# Patient Record
Sex: FEMALE | Race: WHITE | NOT HISPANIC OR LATINO | Employment: FULL TIME | URBAN - METROPOLITAN AREA
[De-identification: names, ages, dates, MRNs, and addresses within clinical notes are randomized per-mention and may not be internally consistent; named-entity substitution may affect disease eponyms.]

---

## 2024-10-04 NOTE — PROGRESS NOTES
[facility] Pulmonary New Patient    CHIEF COMPLAINT    Shortness of breath     HISTORY OF PRESENT ILLNESS    Анна Posadas is a 74 y.o.female who presents today to establish care with our pulmonary service regarding progressive shortness of breath with exertion over the past 3-4 years. Does not recall any issues leading up to this.     History of breast cancer 1999 and again in 2021 with left-wall radiation both times. Followed by Dr. Powell in [place], [place]. Serial imaging has been without disease recurrence.     History of recurrent respiratory illness as a child. Does not carry a history of childhood asthma. Believes she was a term infant.     Works as a tidal  at a car lot.     Does have a parrot as well as cat, dog, and horses.     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     Lives in a 2 level home. Does have a basement. Has not been tested for radon.     She is a lifelong non-smoker.     Does wear 2L NC at night. Had an overnight oximetry that revealed nocturnal hypoxemia. [employer] company is [employer]. 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     Follows with a cardiologist in [place], [place] and does have a LBBB. Unknown if she has had an echocardiogram. Reportedly has had a stress test which was normal although I am unable to view these results.     Denies recent ED visits, hospitalizations, or exacerbations.     Denies fever, chills, night sweats, or hemoptysis. No recent sick contacts. No chest pain or palpitations. Denies lower extremity swelling or calf tenderness.     Patient Active Problem List   Diagnosis    Shortness of breath    Lung nodule    Gastroesophageal reflux disease without esophagitis    MISTI    Nocturnal hypoxemia    History of left breast cancer with radiation x 2    LBBB       Allergies   Allergen Reactions    Codeine GI Intolerance       Current Outpatient Medications:     Apple Cider Vinegar 500 MG tablet, Take  by mouth., Disp: , Rfl:      ascorbic acid (VITAMIN C) 500 MG tablet, Take 1 tablet by mouth Daily., Disp: , Rfl:     aspirin 81 MG chewable tablet, Chew 1 tablet Daily., Disp: , Rfl:     calcium carbonate (OS-JANNY) 600 MG tablet, Take 1 tablet by mouth., Disp: , Rfl:     Cholecalciferol 10 MCG (400 UNIT) tablet, Take 1 tablet by mouth Daily., Disp: , Rfl:     glucosamine-chondroitin 500-400 MG per tablet, Take 2 tablets by mouth 3 (Three) Times a Day., Disp: , Rfl:     lansoprazole (PREVACID) 15 MG capsule, 1 capsule before a meal Orally Once a day for 30 day(s), Disp: , Rfl:     letrozole (FEMARA) 2.5 MG tablet, Take 1 tablet by mouth Daily., Disp: , Rfl:     levalbuterol (XOPENEX HFA) 45 MCG/ACT inhaler, INHALE 2 PUFFS BY MOUTH 4 TIMES DAILY AS NEEDED, Disp: , Rfl:     Magnesium 400 MG tablet, 200 mg., Disp: , Rfl:     metoprolol tartrate (LOPRESSOR) 25 MG tablet, Take 1 tablet by mouth 2 (Two) Times a Day., Disp: , Rfl:     multivitamin (THERAGRAN) tablet tablet, Take 1 tablet by mouth Daily., Disp: , Rfl:     pantoprazole (PROTONIX) 40 MG EC tablet, Take 1 tablet by mouth Daily., Disp: , Rfl:     potassium chloride (KLOR-CON M20) 20 MEQ CR tablet, Take 5 tablets by mouth., Disp: , Rfl:     solifenacin (VESICARE) 10 MG tablet, Take 1 tablet by mouth Daily., Disp: , Rfl:     Vitamin E 450 MG (1000 UT) capsule, Take 0.5 tablets by mouth., Disp: , Rfl:     zinc gluconate 50 MG tablet, Take 1 tablet by mouth Daily., Disp: , Rfl:   MEDICATION LIST AND ALLERGIES REVIEWED.    Social History     Tobacco Use    Smoking status: Never     Passive exposure: Never    Smokeless tobacco: Never   Vaping Use    Vaping status: Never Used   Substance Use Topics    Alcohol use: Not Currently    Drug use: Never       FAMILY AND SOCIAL HISTORY REVIEWED.    Review of Systems   Constitutional:  Negative for chills, fatigue and fever.   Respiratory:  Positive for shortness of breath. Negative for cough, chest tightness and wheezing.    Cardiovascular:  Negative for  "chest pain, palpitations and leg swelling.   Skin:  Negative for color change.   Psychiatric/Behavioral:  Negative for sleep disturbance.    All other systems reviewed and are negative.  .    /70   Pulse 95   Temp 98 °F (36.7 °C)   Ht 177.8 cm (70\")   Wt 89.4 kg (197 lb)   SpO2 94%   BMI 28.27 kg/m²     Immunization History   Administered Date(s) Administered    Arexvy (RSV, Adults 60+ yrs) 11/04/2023    COVID-19 (MODERNA) 1st,2nd,3rd Dose Monovalent 03/12/2021, 04/15/2021    COVID-19 (MODERNA) BIVALENT 12+YRS 10/04/2022    COVID-19 (MODERNA) Monovalent Original Booster 10/26/2021, 05/20/2022    COVID-19 (PFIZER) 12YRS+ (COMIRNATY) 10/05/2023, 09/17/2024    Fluzone High-Dose 65+YRS 09/17/2024    Fluzone High-Dose 65+yrs 10/19/2021, 09/09/2022    Influenza Injectable Mdck Pf Quad 10/05/2023    Pneumococcal Conjugate 13-Valent (PCV13) 10/19/2021    Pneumococcal Conjugate 20-Valent (PCV20) 09/09/2022    Shingrix 12/04/2023       Physical Exam  Vitals reviewed.   Constitutional:       General: She is not in acute distress.     Appearance: Normal appearance.   Cardiovascular:      Rate and Rhythm: Normal rate and regular rhythm.      Pulses: Normal pulses.      Heart sounds: Normal heart sounds.   Pulmonary:      Effort: Pulmonary effort is normal. No respiratory distress.      Breath sounds: No wheezing, rhonchi or rales.   Skin:     General: Skin is warm and dry.   Neurological:      General: No focal deficit present.      Mental Status: She is alert and oriented to person, place, and time.         RESULTS    PFTs:  10/7/24: No airway obstruction by ATS guidelines.  Mild-moderate restriction with TLC 4.74, 78% predicted and FVC 1.62, 63% predicted.  Reduced DLCO that corrects for alveolar volume.    Imaging:   CXR/PA and lateral 10/7/2024  Awaiting MD interpretation    OSH CT of the chest reviewed in PACS dating back to 2015.    PROBLEM LIST    Problem List Items Addressed This Visit       Shortness of " breath    Relevant Orders    Adult Transthoracic Echo Complete W/ Cont if Necessary Per Protocol    CT Chest Without Contrast    Lung nodule - Primary    Relevant Medications    levalbuterol (XOPENEX HFA) 45 MCG/ACT inhaler    Other Relevant Orders    XR Chest PA & Lateral    Breathing Capacity Test (Completed)    Gastroesophageal reflux disease without esophagitis    Relevant Medications    lansoprazole (PREVACID) 15 MG capsule    pantoprazole (PROTONIX) 40 MG EC tablet    MISTI    Nocturnal hypoxemia    History of left breast cancer with radiation x 2    LBBB    Relevant Medications    metoprolol tartrate (LOPRESSOR) 25 MG tablet       DISCUSSION    Ms. Posadas was seen today to establish care with pulmonary with complaints of progressive shortness of breath over the past 2 years.  She does not recall any particular event that ignited her dyspnea.    She does not have any formal history of lung disease and PFTs today do show mild to moderate restriction without obstruction and reduced DLCO that does correct for alveolar volume.    Does have a history of breast cancer and received radiation to her left chest wall in the 1990s and again in 2021.  I suspect that her restrictive defect on her PFTs is likely due to her prior chest wall radiation.    Has had serial CTs of the chest regarding her malignancy which have been reviewed today.  There is no radiology report available, however there is no obvious suspicious pulmonary nodule or masses.  Does appear to have some chronic changes particularly of the anterior left chest wall compatible with radiation changes.  Will repeat a CT of the chest for ongoing evaluation.    Did previously undergo a sleep study noted to have MISTI but was intolerant to CPAP.  As such she was placed on 2 L NC to wear nocturnally and lieu of that diagnosis.  At current she is not interested in repursuing a sleep study and I did discuss that untreated MISTI can had negative health implications.    She  does have a pet bird, however largely denies any constitutional symptoms concerning for a hypersensitivity pneumonitis picture.  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  Is unaware of any overt mold exposure.  Should she develop symptoms, we will pursue additional testing at that time.    Is followed by cardiologist and has a LBBB.  She is unaware of any echocardiogram testing.  I will order this today to rule out any valvular disease or diastolic dysfunction that could be playing a role in her dyspnea.    Does carry a history of gastric reflux on a PPI but does not routinely abide by reflux precautions.  She does have some chronic changes on her CT imaging in the bilateral lower lobes which could reflect chronic microaspiration.  Reflux precautions were discussed at length and I do encourage adherence.    Return to clinic in in 1 month with 6 MWT.  Or may see me sooner if needed.    I personally spent a total of 45 minutes on patient visit today including chart review, face to face with the patient obtaining the history and physical exam, review of pertinent images and tests, counseling and discussion and/or coordination of care as described above, and documentation.  Total time excludes time spent on other separate services such as performing procedures or test interpretation, if applicable.    Electronically signed by EMY Bravo, 10/08/24, 8:58 AM EDT.    Please note that portions of this note were completed with a voice recognition program.      CC: Patricio Castellano MD

## 2024-10-07 ENCOUNTER — OFFICE VISIT (OUTPATIENT)
Dept: PULMONOLOGY | Facility: CLINIC | Age: 74
End: 2024-10-07
Payer: MEDICARE

## 2024-10-07 VITALS
WEIGHT: 197 LBS | DIASTOLIC BLOOD PRESSURE: 70 MMHG | HEART RATE: 95 BPM | BODY MASS INDEX: 28.2 KG/M2 | HEIGHT: 70 IN | TEMPERATURE: 98 F | OXYGEN SATURATION: 94 % | SYSTOLIC BLOOD PRESSURE: 120 MMHG

## 2024-10-07 DIAGNOSIS — R91.1 LUNG NODULE: Primary | ICD-10-CM

## 2024-10-07 DIAGNOSIS — G47.33 OSA (OBSTRUCTIVE SLEEP APNEA): ICD-10-CM

## 2024-10-07 DIAGNOSIS — I44.7 LBBB (LEFT BUNDLE BRANCH BLOCK): ICD-10-CM

## 2024-10-07 DIAGNOSIS — Z85.3 HISTORY OF BREAST CANCER: ICD-10-CM

## 2024-10-07 DIAGNOSIS — G47.34 NOCTURNAL HYPOXEMIA: ICD-10-CM

## 2024-10-07 DIAGNOSIS — K21.9 GASTROESOPHAGEAL REFLUX DISEASE WITHOUT ESOPHAGITIS: ICD-10-CM

## 2024-10-07 DIAGNOSIS — R06.02 SHORTNESS OF BREATH: ICD-10-CM

## 2024-10-07 RX ORDER — SOLIFENACIN SUCCINATE 10 MG/1
1 TABLET, FILM COATED ORAL DAILY
COMMUNITY

## 2024-10-07 RX ORDER — LETROZOLE 2.5 MG/1
1 TABLET, FILM COATED ORAL DAILY
COMMUNITY

## 2024-10-07 RX ORDER — POTASSIUM CHLORIDE 1500 MG/1
100 TABLET, EXTENDED RELEASE ORAL
COMMUNITY

## 2024-10-07 RX ORDER — CALCIUM CARBONATE 300MG(750)
200 TABLET,CHEWABLE ORAL
COMMUNITY

## 2024-10-07 RX ORDER — MECOBALAMIN 5000 MCG
TABLET,DISINTEGRATING ORAL
COMMUNITY

## 2024-10-07 RX ORDER — PHENOL 1.4 %
600 AEROSOL, SPRAY (ML) MUCOUS MEMBRANE
COMMUNITY

## 2024-10-07 RX ORDER — ASCORBIC ACID 500 MG
500 TABLET ORAL DAILY
COMMUNITY

## 2024-10-07 RX ORDER — METOPROLOL TARTRATE 25 MG/1
1 TABLET, FILM COATED ORAL 2 TIMES DAILY
COMMUNITY

## 2024-10-07 RX ORDER — ZINC GLUCONATE 50 MG
50 TABLET ORAL DAILY
COMMUNITY

## 2024-10-07 RX ORDER — TETRACYCLINE HCL 500 MG
CAPSULE ORAL
COMMUNITY

## 2024-10-07 RX ORDER — MULTIVIT WITH MINERALS/LUTEIN
0.5 TABLET ORAL
COMMUNITY

## 2024-10-07 RX ORDER — DIPHENOXYLATE HYDROCHLORIDE AND ATROPINE SULFATE 2.5; .025 MG/1; MG/1
1 TABLET ORAL DAILY
COMMUNITY

## 2024-10-07 RX ORDER — PANTOPRAZOLE SODIUM 40 MG/1
1 TABLET, DELAYED RELEASE ORAL DAILY
COMMUNITY

## 2024-10-07 RX ORDER — ASPIRIN 81 MG/1
1 TABLET, CHEWABLE ORAL DAILY
COMMUNITY

## 2024-10-07 RX ORDER — RIBOFLAVIN (VITAMIN B2) 100 MG
2 TABLET ORAL 3 TIMES DAILY
COMMUNITY

## 2024-10-07 RX ORDER — OMEGA-3S/DHA/EPA/FISH OIL/D3 300MG-1000
400 CAPSULE ORAL DAILY
COMMUNITY

## 2024-10-07 RX ORDER — LEVALBUTEROL TARTRATE 45 UG/1
AEROSOL, METERED ORAL
COMMUNITY

## 2024-10-08 PROBLEM — G47.34 NOCTURNAL HYPOXEMIA: Status: ACTIVE | Noted: 2024-10-08

## 2024-10-08 PROBLEM — K21.9 GASTROESOPHAGEAL REFLUX DISEASE WITHOUT ESOPHAGITIS: Status: ACTIVE | Noted: 2024-10-08

## 2024-10-08 PROBLEM — Z85.3 HISTORY OF BREAST CANCER: Status: ACTIVE | Noted: 2024-10-08

## 2024-10-08 PROBLEM — I44.7 LBBB (LEFT BUNDLE BRANCH BLOCK): Status: ACTIVE | Noted: 2024-10-08

## 2024-10-08 PROBLEM — G47.33 OSA (OBSTRUCTIVE SLEEP APNEA): Status: ACTIVE | Noted: 2024-10-08

## 2024-10-08 PROBLEM — R91.1 LUNG NODULE: Status: ACTIVE | Noted: 2024-10-08

## 2024-10-29 DIAGNOSIS — R06.02 SHORTNESS OF BREATH: ICD-10-CM

## 2024-11-04 ENCOUNTER — OFFICE VISIT (OUTPATIENT)
Dept: PULMONOLOGY | Facility: CLINIC | Age: 74
End: 2024-11-04
Payer: MEDICARE

## 2024-11-04 VITALS
OXYGEN SATURATION: 98 % | TEMPERATURE: 97.8 F | BODY MASS INDEX: 28.2 KG/M2 | SYSTOLIC BLOOD PRESSURE: 112 MMHG | HEART RATE: 86 BPM | HEIGHT: 70 IN | DIASTOLIC BLOOD PRESSURE: 82 MMHG | RESPIRATION RATE: 16 BRPM | WEIGHT: 197 LBS

## 2024-11-04 DIAGNOSIS — Z85.3 HISTORY OF BREAST CANCER: ICD-10-CM

## 2024-11-04 DIAGNOSIS — R06.02 SHORTNESS OF BREATH: ICD-10-CM

## 2024-11-04 DIAGNOSIS — R00.2 PALPITATIONS: ICD-10-CM

## 2024-11-04 DIAGNOSIS — R91.1 LUNG NODULE: ICD-10-CM

## 2024-11-04 DIAGNOSIS — R00.0 RAPID OR IRREGULAR HEARTBEAT: Primary | ICD-10-CM

## 2024-11-04 DIAGNOSIS — K21.9 GASTROESOPHAGEAL REFLUX DISEASE WITHOUT ESOPHAGITIS: ICD-10-CM

## 2024-11-04 DIAGNOSIS — G47.34 NOCTURNAL HYPOXEMIA: ICD-10-CM

## 2024-11-04 PROCEDURE — 93005 ELECTROCARDIOGRAM TRACING: CPT | Performed by: NURSE PRACTITIONER

## 2024-11-04 PROCEDURE — 99214 OFFICE O/P EST MOD 30 MIN: CPT | Performed by: NURSE PRACTITIONER

## 2024-11-04 PROCEDURE — 94618 PULMONARY STRESS TESTING: CPT | Performed by: NURSE PRACTITIONER

## 2024-11-04 NOTE — PROGRESS NOTES
"[facility] Pulmonary New Patient    CHIEF COMPLAINT    Shortness of breath     HISTORY OF PRESENT ILLNESS    HPI:   Анна Posadas is a 74 y.o.female who recently established care with our pulmonary service regarding her progressive exertional shortness of breath over the past 3-4 years. Does not recall any issues leading up to this.     History of breast cancer 1999 and again in 2021 with left-wall radiation both times. Followed by Dr. Powell in [place], [place]. Serial imaging has been without disease recurrence.     History of recurrent respiratory illness as a child. Does not carry a history of childhood asthma. Believes she was a term infant.     Works as a tidal  at a car lot.     Does have a parrot as well as cat, dog, and horses.     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     Lives in a two level home. Does have a basement. Has not been tested for radon.     She is a lifelong non-smoker.     Does wear 2L NC at night. Had an overnight oximetry that revealed nocturnal hypoxemia. [employer] is [employer]. 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     Follows with a cardiologist in [place], [place] and does have a LBBB. Unknown if she has had an echocardiogram. Reportedly has had a stress test which was normal although I am unable to view these results.     Interval history:   Ms. Posadas is seen today in follow-up and is here to discuss her most recent CT of the chest.    Continues to have issues with exertional dyspnea although she is very active.  She still works full-time and also owns a farm with multiple animals.    She is able to walk on a flat surface for quite some time before she becomes short of breath.  She does feel that when she is \"rushed\" that this significantly worsens her shortness of breath and believes there may be an underlying anxiety component.    Continues to follow with cardiology in [place] and does complain of palpitations accompanied by " this shortness of breath.  She will check her heart rate and it will be as high as the 120s but her BP is reportedly stable.  She does become dizzy with this as well.    Denies recent ED visits, hospitalizations, or exacerbations.     Denies fever, chills, night sweats, or hemoptysis. No recent sick contacts. No chest pain or palpitations. Denies lower extremity swelling or calf tenderness.     Patient Active Problem List   Diagnosis    Shortness of breath    Lung nodule    Gastroesophageal reflux disease without esophagitis    MISTI    Nocturnal hypoxemia    History of left breast cancer with radiation x 2    LBBB    Palpitations       Allergies   Allergen Reactions    Codeine GI Intolerance       Current Outpatient Medications:     Apple Cider Vinegar 500 MG tablet, Take  by mouth., Disp: , Rfl:     ascorbic acid (VITAMIN C) 500 MG tablet, Take 1 tablet by mouth Daily., Disp: , Rfl:     aspirin 81 MG chewable tablet, Chew 1 tablet Daily., Disp: , Rfl:     calcium carbonate (OS-JANNY) 600 MG tablet, Take 1 tablet by mouth., Disp: , Rfl:     Cholecalciferol 10 MCG (400 UNIT) tablet, Take 1 tablet by mouth Daily., Disp: , Rfl:     glucosamine-chondroitin 500-400 MG per tablet, Take 2 tablets by mouth 3 (Three) Times a Day., Disp: , Rfl:     lansoprazole (PREVACID) 15 MG capsule, 1 capsule before a meal Orally Once a day for 30 day(s), Disp: , Rfl:     letrozole (FEMARA) 2.5 MG tablet, Take 1 tablet by mouth Daily., Disp: , Rfl:     levalbuterol (XOPENEX HFA) 45 MCG/ACT inhaler, INHALE 2 PUFFS BY MOUTH 4 TIMES DAILY AS NEEDED, Disp: , Rfl:     Magnesium 400 MG tablet, 200 mg., Disp: , Rfl:     metoprolol tartrate (LOPRESSOR) 25 MG tablet, Take 1 tablet by mouth 2 (Two) Times a Day., Disp: , Rfl:     multivitamin (THERAGRAN) tablet tablet, Take 1 tablet by mouth Daily., Disp: , Rfl:     pantoprazole (PROTONIX) 40 MG EC tablet, Take 1 tablet by mouth Daily., Disp: , Rfl:     potassium chloride (KLOR-CON M20) 20 MEQ CR  "tablet, Take 5 tablets by mouth., Disp: , Rfl:     solifenacin (VESICARE) 10 MG tablet, Take 1 tablet by mouth Daily., Disp: , Rfl:     Vitamin E 450 MG (1000 UT) capsule, Take 0.5 tablets by mouth., Disp: , Rfl:     zinc gluconate 50 MG tablet, Take 1 tablet by mouth Daily., Disp: , Rfl:   MEDICATION LIST AND ALLERGIES REVIEWED.    Social History     Tobacco Use    Smoking status: Never     Passive exposure: Never    Smokeless tobacco: Never   Vaping Use    Vaping status: Never Used   Substance Use Topics    Alcohol use: Not Currently    Drug use: Never       FAMILY AND SOCIAL HISTORY REVIEWED.    Review of Systems   Constitutional:  Negative for chills, fatigue and fever.   Respiratory:  Positive for shortness of breath. Negative for cough, chest tightness and wheezing.    Cardiovascular:  Positive for palpitations. Negative for chest pain and leg swelling.   Skin:  Negative for color change.   Psychiatric/Behavioral:  Negative for sleep disturbance.    All other systems reviewed and are negative.  .    /82   Pulse 86   Temp 97.8 °F (36.6 °C)   Resp 16   Ht 177.8 cm (70\")   Wt 89.4 kg (197 lb)   SpO2 98% Comment: room air at resting  BMI 28.27 kg/m²     Immunization History   Administered Date(s) Administered    Arexvy (RSV, Adults 60+ yrs) 11/04/2023    COVID-19 (MODERNA) 1st,2nd,3rd Dose Monovalent 03/12/2021, 04/15/2021    COVID-19 (MODERNA) BIVALENT 12+YRS 10/04/2022    COVID-19 (MODERNA) Monovalent Original Booster 10/26/2021, 05/20/2022    COVID-19 (PFIZER) 12YRS+ (COMIRNATY) 10/05/2023, 09/17/2024    Fluzone High-Dose 65+YRS 09/17/2024    Fluzone High-Dose 65+yrs 10/19/2021, 09/09/2022    Influenza Injectable Mdck Pf Quad 10/05/2023    Pneumococcal Conjugate 13-Valent (PCV13) 10/19/2021    Pneumococcal Conjugate 20-Valent (PCV20) 09/09/2022    Shingrix 12/04/2023       Physical Exam  Vitals reviewed.   Constitutional:       General: She is not in acute distress.     Appearance: Normal " appearance.   Cardiovascular:      Rate and Rhythm: Normal rate and regular rhythm.      Pulses: Normal pulses.      Heart sounds: Normal heart sounds.   Pulmonary:      Effort: Pulmonary effort is normal. No respiratory distress.      Breath sounds: No wheezing, rhonchi or rales.   Skin:     General: Skin is warm and dry.   Neurological:      General: No focal deficit present.      Mental Status: She is alert and oriented to person, place, and time.         RESULTS    PFTs:  10/7/24: No airway obstruction by ATS guidelines.  Mild-moderate restriction with TLC 4.74, 78% predicted and FVC 1.62, 63% predicted.  Reduced DLCO that corrects for alveolar volume.    Imaging:   OSH CT of the chest [facility] 10/28/2024  1.  Stable reticular interstitial changes in the lungs.  Stable groundglass opacity in the right apex.  Stable nodules in the lungs.  No definite metastatic nodules.  2.  The liver is decreased attenuation consistent with fatty infiltration with minimal nodular raises concern for possible cirrhotic change.    OSH CT of the chest reviewed in PACS dating back to 2015.    OSH echocardiogram 10/28/2024  Normal sinus rhythm with ectopy  EF 55-60%  No diagnostic regional wall motion abnormality  Suggestive of grade 1 diastolic dysfunction  Minimal valvular disease noted    6 MWT  11/4/2024: Unrevealing for exertional hypoxemia with lowest SpO2 90% at the 3-minute leonidas.  She was able to ambulate 900 feet.    PROBLEM LIST    Problem List Items Addressed This Visit       Shortness of breath    Lung nodule    Gastroesophageal reflux disease without esophagitis    Nocturnal hypoxemia    History of left breast cancer with radiation x 2    Palpitations     Other Visit Diagnoses       Rapid or irregular heartbeat    -  Primary          DISCUSSION    Ms. Posadas was seen today in follow-up complaints of progressive shortness of breath over the past 2 years.  PFTs unrevealing for obstruction but did show  restriction and she does have a history of breast cancer received radiation to her left chest wall in the 1990s and again in 2021, therefore her restrictive defect was felt to be related to this.    She has been stable since her last pulmonary evaluation.    Has had intermittent palpitations and tachyarrhythmias since her last evaluation that she feels causes significant shortness of breath accompanied by some dizziness.    Exertional shortness of breath  Restrictive defect on PFTs  MISTI intolerant of CPAP /nocturnal hypoxemia  GERD  Palpitations  PFTs unrevealing for obstruction and do show mild to moderate restriction.  This is most likely related to her prior history of left chest wall radiation.  Will continue to follow this closely and repeat PFTs next fall.  Serial CT of the chest continues to show stable reticular interstitial changes with a stable groundglass opacity in the right apex.  Does have some pulmonary nodules largest measuring 8 mm in the left posterior major fissure stable compared to 2022 imaging.  Continue Xopenex as needed for shortness of breath  Previously did undergo a sleep study noted to have MISTI but was intolerant to BiPAP.  As such she was placed on 2 L NC to wear nocturnally.  Declines repeat sleep study or overnight oximetry at that time and wishes to continue supplemental oxygen at night.  6 MWT today unrevealing for exertional hypoxemia  Echocardiogram unrevealing for dyspnea etiology.  Noted to have grade 1 diastolic dysfunction but appears euvolemic on exam today and denies any issues with lower extremity swelling.  I am concerned she could have an underlying atrial arrhythmia given her symptomatic palpitations accompanied by dizziness.  EKG obtained today in the office and does show bigeminy with ongoing LBBB.  She does have a history of a LBBB followed by cardiologist in [place]. I have urged her to consider mobile cardiac monitoring and she will reach out to her cardiologist  regarding this.  I am also more than happy to order this.  She does have a pet bird, however largely denies any constitutional symptoms concerning for a hypersensitivity pneumonitis picture.  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  Is unaware of any overt mold exposure.  Should she develop symptoms, we will pursue additional testing at that time.  Does carry a history of gastric reflux on a PPI but does not routinely abide by reflux precautions.  She does have some chronic changes on her CT imaging in the bilateral lower lobes which could reflect chronic microaspiration.  Reflux precautions were discussed at length and I do encourage adherence.    Return to clinic in in 6 month with Dr. Thompson or may see me sooner if needed.    I personally spent a total of 35 minutes on patient visit today including chart review, face to face with the patient obtaining the history and physical exam, review of pertinent images and tests, counseling and discussion and/or coordination of care as described above, and documentation.  Total time excludes time spent on other separate services such as performing procedures or test interpretation, if applicable.    Electronically signed by EMY Bravo, 11/04/24, 2:32 PM EST.    Please note that portions of this note were completed with a voice recognition program.      CC: Patricio Castellano MD

## 2024-11-05 DIAGNOSIS — R06.02 SHORTNESS OF BREATH: ICD-10-CM

## 2025-02-12 ENCOUNTER — TELEPHONE (OUTPATIENT)
Dept: PULMONOLOGY | Facility: CLINIC | Age: 75
End: 2025-02-12
Payer: MEDICARE

## 2025-02-12 NOTE — TELEPHONE ENCOUNTER
Pt called wanting to make sure that Heart Monitor or Stress Test have received. Called [facility] for report to be faxed.

## 2025-04-28 ENCOUNTER — OFFICE VISIT (OUTPATIENT)
Dept: PULMONOLOGY | Facility: CLINIC | Age: 75
End: 2025-04-28
Payer: MEDICARE

## 2025-04-28 VITALS
BODY MASS INDEX: 29.78 KG/M2 | HEART RATE: 82 BPM | SYSTOLIC BLOOD PRESSURE: 140 MMHG | HEIGHT: 70 IN | WEIGHT: 208 LBS | DIASTOLIC BLOOD PRESSURE: 92 MMHG | OXYGEN SATURATION: 95 %

## 2025-04-28 DIAGNOSIS — I44.7 LBBB (LEFT BUNDLE BRANCH BLOCK): ICD-10-CM

## 2025-04-28 DIAGNOSIS — G47.34 NOCTURNAL HYPOXEMIA: ICD-10-CM

## 2025-04-28 DIAGNOSIS — R91.1 LUNG NODULE: ICD-10-CM

## 2025-04-28 DIAGNOSIS — R06.02 SHORTNESS OF BREATH: ICD-10-CM

## 2025-04-28 DIAGNOSIS — K21.9 GASTROESOPHAGEAL REFLUX DISEASE WITHOUT ESOPHAGITIS: ICD-10-CM

## 2025-04-28 DIAGNOSIS — R00.2 PALPITATIONS: ICD-10-CM

## 2025-04-28 DIAGNOSIS — G47.33 OSA (OBSTRUCTIVE SLEEP APNEA): ICD-10-CM

## 2025-04-28 DIAGNOSIS — Z85.3 HISTORY OF BREAST CANCER: Primary | ICD-10-CM

## 2025-04-28 RX ORDER — ALBUTEROL SULFATE 90 UG/1
2 INHALANT RESPIRATORY (INHALATION) EVERY 4 HOURS PRN
Qty: 17 G | Refills: 11 | Status: SHIPPED | OUTPATIENT
Start: 2025-04-28

## 2025-04-28 RX ORDER — FLECAINIDE ACETATE 50 MG/1
1 TABLET ORAL EVERY 12 HOURS
COMMUNITY
Start: 2025-04-14

## 2025-04-28 RX ORDER — CELECOXIB 200 MG/1
1 CAPSULE ORAL DAILY
COMMUNITY
Start: 2025-04-16

## 2025-04-28 NOTE — PROGRESS NOTES
[facility] New Patient    CHIEF COMPLAINT    Shortness of breath     HISTORY OF PRESENT ILLNESS    HPI:   Анна Posadas is a 75 y.o.female who recently established care with our pulmonary service regarding her progressive exertional shortness of breath over the past 3-4 years. Does not recall any issues leading up to this.     History of breast cancer 1999 and again in 2021 with left-wall radiation both times. Followed by Dr. Powell in [place], [place]. Serial imaging has been without disease recurrence.     She is a lifelong non-smoker. No history of recurrent respiratory illness as a child or adult.  PFTs were unrevealing for obstruction but did show some mild-moderate restriction and a reduced DLCO that corrected for alveolar volume.  This has been attributed to her prior chest wall radiation.  No history of autoimmune disease.    CT imaging displayed stable reticular interstitial changes with a stable groundglass opacity in the right apex and some scattered pulmonary nodules largest measuring 8 mm in the left posterior major fissure all stable compared to 2022 imaging.    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     Does have a parrot as well as cat, dog, and horses. 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  She is quite active at baseline.    Does wear 2L NC at night. Had an overnight oximetry that revealed nocturnal hypoxemia. [employer] is [employer]. 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     Follows with a cardiologist in [place], [place] and does have a LBBB.  Echocardiogram did show grade 1 diastolic dysfunction and she is not on diuretics.. Reportedly has had a stress test which was normal although I am unable to view these results.     Interval history:   Patient was last seen in the office by me in November.  At that time her serial CT was stable.  As her dyspnea was ongoing coupled with symptomatic palpitations and dizziness, she underwent EKG which  showed bigeminy with an ongoing LBBB and was instructed to contact her cardiologist for further management.    He eventually did undergo mobile cardiac monitoring which did show some SVT and her cardiologist placed on a low-dose of flecainide along with Toprol.  Her symptoms have dramatically improved and she no longer has any exertional dyspnea.    Denies recent ED visits, hospitalizations, or exacerbations.     Denies fever, chills, night sweats, or hemoptysis. No recent sick contacts. No chest pain or palpitations. Denies lower extremity swelling or calf tenderness.     Patient Active Problem List   Diagnosis    Shortness of breath    Lung nodule    Gastroesophageal reflux disease without esophagitis    MISTI    Nocturnal hypoxemia    History of left breast cancer with radiation x 2    LBBB    Palpitations       Allergies   Allergen Reactions    Codeine GI Intolerance       Current Outpatient Medications:     ascorbic acid (VITAMIN C) 500 MG tablet, Take 1 tablet by mouth Daily., Disp: , Rfl:     calcium carbonate (OS-JANNY) 600 MG tablet, Take 1 tablet by mouth., Disp: , Rfl:     celecoxib (CeleBREX) 200 MG capsule, Take 1 capsule by mouth Daily., Disp: , Rfl:     Cholecalciferol 10 MCG (400 UNIT) tablet, Take 1 tablet by mouth Daily., Disp: , Rfl:     flecainide (TAMBOCOR) 50 MG tablet, Take 1 tablet by mouth Every 12 (Twelve) Hours., Disp: , Rfl:     glucosamine-chondroitin 500-400 MG per tablet, Take 2 tablets by mouth 3 (Three) Times a Day., Disp: , Rfl:     lansoprazole (PREVACID) 15 MG capsule, 1 capsule before a meal Orally Once a day for 30 day(s), Disp: , Rfl:     letrozole (FEMARA) 2.5 MG tablet, Take 1 tablet by mouth Daily., Disp: , Rfl:     levalbuterol (XOPENEX HFA) 45 MCG/ACT inhaler, INHALE 2 PUFFS BY MOUTH 4 TIMES DAILY AS NEEDED, Disp: , Rfl:     Magnesium 400 MG tablet, 200 mg., Disp: , Rfl:     Methylcobalamin 1000 MCG sublingual tablet, Place by sublingual route., Disp: , Rfl:     metoprolol  "tartrate (LOPRESSOR) 25 MG tablet, Take 1 tablet by mouth 2 (Two) Times a Day., Disp: , Rfl:     multivitamin (THERAGRAN) tablet tablet, Take 1 tablet by mouth Daily., Disp: , Rfl:     potassium chloride (KLOR-CON M20) 20 MEQ CR tablet, Take 5 tablets by mouth., Disp: , Rfl:     solifenacin (VESICARE) 10 MG tablet, Take 1 tablet by mouth Daily., Disp: , Rfl:     Vitamin E 450 MG (1000 UT) capsule, Take 0.5 tablets by mouth., Disp: , Rfl:     zinc gluconate 50 MG tablet, Take 1 tablet by mouth Daily., Disp: , Rfl:     albuterol sulfate  (90 Base) MCG/ACT inhaler, Inhale 2 puffs Every 4 (Four) Hours As Needed for Wheezing., Disp: 17 g, Rfl: 11    Apple Cider Vinegar 500 MG tablet, Take  by mouth. (Patient not taking: Reported on 4/28/2025), Disp: , Rfl:     aspirin 81 MG chewable tablet, Chew 1 tablet Daily. (Patient not taking: Reported on 4/28/2025), Disp: , Rfl:     pantoprazole (PROTONIX) 40 MG EC tablet, Take 1 tablet by mouth Daily. (Patient not taking: Reported on 4/28/2025), Disp: , Rfl:   MEDICATION LIST AND ALLERGIES REVIEWED.    Social History     Tobacco Use    Smoking status: Never     Passive exposure: Never    Smokeless tobacco: Never   Vaping Use    Vaping status: Never Used   Substance Use Topics    Alcohol use: Not Currently    Drug use: Never       FAMILY AND SOCIAL HISTORY REVIEWED.    Review of Systems   Constitutional:  Negative for chills, fatigue and fever.   Respiratory:  Negative for cough, chest tightness, shortness of breath and wheezing.    Cardiovascular:  Negative for chest pain, palpitations and leg swelling.   Skin:  Negative for color change.   Psychiatric/Behavioral:  Negative for sleep disturbance.    All other systems reviewed and are negative.  .    /92   Pulse 82   Ht 177.8 cm (70\")   Wt 94.3 kg (208 lb)   SpO2 95%   BMI 29.84 kg/m²     Immunization History   Administered Date(s) Administered    Arexvy (RSV, Adults 60+ yrs) 11/04/2023    COVID-19 (MODERNA) " 1st,2nd,3rd Dose Monovalent 03/12/2021, 04/15/2021    COVID-19 (MODERNA) BIVALENT 12+YRS 10/04/2022    COVID-19 (MODERNA) Monovalent Original Booster 10/26/2021, 05/20/2022    COVID-19 (PFIZER) 12YRS+ (COMIRNATY) 10/05/2023, 09/17/2024    Fluzone High-Dose 65+YRS 09/17/2024    Fluzone High-Dose 65+yrs 10/19/2021, 09/09/2022    Influenza Injectable Mdck Pf Quad 10/05/2023    Pneumococcal Conjugate 13-Valent (PCV13) 10/19/2021    Pneumococcal Conjugate 20-Valent (PCV20) 09/09/2022    Shingrix 12/04/2023       Physical Exam  Vitals reviewed.   Constitutional:       General: She is not in acute distress.     Appearance: Normal appearance.   Cardiovascular:      Rate and Rhythm: Normal rate and regular rhythm.      Pulses: Normal pulses.      Heart sounds: Normal heart sounds.   Pulmonary:      Effort: Pulmonary effort is normal. No respiratory distress.      Breath sounds: No wheezing, rhonchi or rales.   Skin:     General: Skin is warm and dry.   Neurological:      General: No focal deficit present.      Mental Status: She is alert and oriented to person, place, and time.       RESULTS    PFTs:  10/7/24: No airway obstruction by ATS guidelines.  Mild-moderate restriction with TLC 4.74, 78% predicted and FVC 1.62, 63% predicted.  Reduced DLCO that corrects for alveolar volume.    6 MWT:  11/4/2024: Unrevealing for exertional hypoxemia with lowest SpO2 90% at the 3-minute leonidas.  She was able to ambulate 900 feet.    Imaging:   OSH CT of the chest performed at [facility] [place] 10/28/2024  1.  Stable reticular interstitial changes in the lungs.  Stable groundglass opacity in the right apex.  Stable nodules in the lungs.  No definite metastatic nodules.  2.  The liver is decreased attenuation consistent with fatty infiltration with minimal nodular raises concern for possible cirrhotic change.    OSH CT of the chest reviewed in PACS dating back to 2015.    Cardiac:  OSH echocardiogram 10/28/2024  Normal sinus rhythm with  ectopy  EF 55-60%  No diagnostic regional wall motion abnormality  Suggestive of grade 1 diastolic dysfunction  Minimal valvular disease noted    PROBLEM LIST    Problem List Items Addressed This Visit       Shortness of breath    Relevant Medications    albuterol sulfate  (90 Base) MCG/ACT inhaler    Lung nodule    Relevant Medications    albuterol sulfate  (90 Base) MCG/ACT inhaler    Gastroesophageal reflux disease without esophagitis    MISTI    Nocturnal hypoxemia    History of left breast cancer with radiation x 2 - Primary    LBBB    Relevant Medications    flecainide (TAMBOCOR) 50 MG tablet    Palpitations     DISCUSSION    Ms. Posadas was seen today in follow-up and is stable from a respiratory standpoint.    Her exertional dyspnea had no igniting event but was accompanied by intermittent palpitations, tachyarrhythmias, and dizziness.  EKG did show bigeminy and a LBBB, therefore she was instructed to contact her cardiologist and underwent Holter monitoring which did show SVT and she was placed on flecainide and metoprolol with symptom resolution.    Since adjusting her cardiac medications, her exertional dyspnea has completely resolved.    Remains on her 2 L nocturnally as she was intolerant to CPAP in the past and was noted to have nocturnal hypoxemia.    Exertional shortness of breath  Restrictive defect on PFTs  MISTI intolerant of CPAP / nocturnal hypoxemia  GERD  Palpitations  PFTs unrevealing for obstruction and do show mild to moderate restriction.  This is most likely related to her prior history of left chest wall radiation.  Will continue to follow this closely and repeat PFTs next fall.  No history of autoimmune disease and her DLCO does correct for alveolar volume.  No crackles noted on exam.  Serial CT of the chest continues to show stable reticular interstitial changes with a stable groundglass opacity in the right apex.  Does have some pulmonary nodules largest measuring 8 mm in the  left posterior major fissure stable compared to 2022 imaging.  Continue Xopenex as needed for shortness of breath.  Seldom use this.  Previously did undergo a sleep study noted to have MISTI but was intolerant to CPAP.  As such she was placed on 2 L NC to wear nocturnally.  Declines repeat sleep study or overnight oximetry at that time and wishes to continue supplemental oxygen at night.  6 MWT was unrevealing for exertional hypoxemia  Echocardiogram suggestive of grade 1 diastolic dysfunction but appears euvolemic on exam today and denies any issues with lower extremity swelling.  Ultimately she underwent some mobile cardiac monitoring which did show some symptomatic SVT and is now on flecainide and metoprolol with complete resolution of her symptoms.  Continue to follow with her cardiologist.  She does have a pet bird, however largely denies any constitutional symptoms concerning for a hypersensitivity pneumonitis picture.  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  Is unaware of any overt mold exposure.  Should she develop symptoms, we will pursue additional testing at that time.  Does carry a history of gastric reflux on a PPI but does not routinely abide by reflux precautions.  She does have some chronic changes on her CT imaging in the bilateral lower lobes which could reflect chronic microaspiration.  Reflux precautions were discussed at length and I do encourage adherence.    Return to clinic in in 6 month with Dr. Thompson.  PFTs and CXR at that time. Or may see me sooner if needed.    I personally spent a total of 35 minutes on patient visit today including chart review, face to face with the patient obtaining the history and physical exam, review of pertinent images and tests, counseling and discussion and/or coordination of care as described above, and documentation.  Total time excludes time spent on other separate services  such as performing procedures or test interpretation, if applicable.    Electronically signed by EMY Bravo, 04/28/25, 1:19 PM EDT.    Please note that portions of this note were completed with a voice recognition program.      CC: Patricio Castellano MD

## 2025-06-17 ENCOUNTER — TELEPHONE (OUTPATIENT)
Dept: PULMONOLOGY | Facility: CLINIC | Age: 75
End: 2025-06-17
Payer: MEDICARE

## 2025-06-17 NOTE — TELEPHONE ENCOUNTER
PTREFUSED TO COME ON WED/FRI DUE TO WORK WHEN SCHED FOR 6MO FU W FOR PULMONARY. N.S- 06/17 SARKIS ONLY IN OFFICE WED/FRI.